# Patient Record
(demographics unavailable — no encounter records)

---

## 2024-11-05 NOTE — HISTORY OF PRESENT ILLNESS
[FreeTextEntry1] : MORALES VALLEJO is a 58 year male who presents with BPH, elevated PSA. He complains of frequency for many years. Mr. VALLEJO has been treated with daily cialis with adequate improvements.   The patient is sexually active and has erectile dysfunction.  hx of prostate bx - hgpin.  [Urinary Urgency] : urinary urgency [Urinary Frequency] : urinary frequency [Nocturia] : nocturia [None] : None

## 2024-11-05 NOTE — ASSESSMENT
[FreeTextEntry1] : 1. BPH - cont daily cialis.  2. elevated PSA - hx of negative bx x2. followed by Dr. Oneill.